# Patient Record
Sex: FEMALE | Race: WHITE | NOT HISPANIC OR LATINO | Employment: PART TIME | URBAN - METROPOLITAN AREA
[De-identification: names, ages, dates, MRNs, and addresses within clinical notes are randomized per-mention and may not be internally consistent; named-entity substitution may affect disease eponyms.]

---

## 2017-02-06 ENCOUNTER — HOSPITAL ENCOUNTER (OUTPATIENT)
Dept: RADIOLOGY | Facility: OTHER | Age: 52
Discharge: HOME | End: 2017-02-06
Attending: OTOLARYNGOLOGY
Payer: MEDICAID

## 2017-02-06 DIAGNOSIS — R07.0 THROAT PAIN: ICD-10-CM

## 2017-02-06 PROCEDURE — 70491 CT SOFT TISSUE NECK W/DYE: CPT | Mod: TC

## 2017-02-06 PROCEDURE — 70491 CT SOFT TISSUE NECK W/DYE: CPT | Mod: 26,,, | Performed by: RADIOLOGY

## 2017-02-06 PROCEDURE — 25500020 PHARM REV CODE 255: Performed by: OTOLARYNGOLOGY

## 2017-02-06 RX ADMIN — IOHEXOL 75 ML: 350 INJECTION, SOLUTION INTRAVENOUS at 10:02

## 2018-04-05 DIAGNOSIS — J44.9 COPD (CHRONIC OBSTRUCTIVE PULMONARY DISEASE): Primary | ICD-10-CM

## 2018-06-20 ENCOUNTER — CLINICAL SUPPORT (OUTPATIENT)
Dept: OCCUPATIONAL MEDICINE | Facility: CLINIC | Age: 53
End: 2018-06-20

## 2018-06-20 DIAGNOSIS — Z02.83 ENCOUNTER FOR DRUG SCREENING: Primary | ICD-10-CM

## 2018-06-20 LAB
CTP QC/QA: YES
POC 5 PANEL DRUG SCREEN: NEGATIVE

## 2018-06-20 PROCEDURE — 80305 DRUG TEST PRSMV DIR OPT OBS: CPT | Mod: QW,S$GLB,, | Performed by: NURSE PRACTITIONER

## 2019-03-10 ENCOUNTER — HOSPITAL ENCOUNTER (EMERGENCY)
Facility: HOSPITAL | Age: 54
Discharge: HOME | End: 2019-03-10
Attending: EMERGENCY MEDICINE
Payer: MEDICAID

## 2019-03-10 VITALS
SYSTOLIC BLOOD PRESSURE: 139 MMHG | HEART RATE: 80 BPM | DIASTOLIC BLOOD PRESSURE: 75 MMHG | OXYGEN SATURATION: 97 % | WEIGHT: 145 LBS | RESPIRATION RATE: 16 BRPM | HEIGHT: 64 IN | TEMPERATURE: 99 F | BODY MASS INDEX: 24.75 KG/M2

## 2019-03-10 DIAGNOSIS — R05.9 COUGH: ICD-10-CM

## 2019-03-10 DIAGNOSIS — J20.9 ACUTE BRONCHITIS, UNSPECIFIED ORGANISM: Primary | ICD-10-CM

## 2019-03-10 LAB
CTP QC/QA: YES
POC MOLECULAR INFLUENZA A AGN: NEGATIVE
POC MOLECULAR INFLUENZA B AGN: NEGATIVE

## 2019-03-10 PROCEDURE — 94640 AIRWAY INHALATION TREATMENT: CPT

## 2019-03-10 PROCEDURE — 99284 EMERGENCY DEPT VISIT MOD MDM: CPT | Mod: ,,, | Performed by: EMERGENCY MEDICINE

## 2019-03-10 PROCEDURE — 99284 PR EMERGENCY DEPT VISIT,LEVEL IV: ICD-10-PCS | Mod: ,,, | Performed by: EMERGENCY MEDICINE

## 2019-03-10 PROCEDURE — 63600175 PHARM REV CODE 636 W HCPCS: Performed by: EMERGENCY MEDICINE

## 2019-03-10 PROCEDURE — 25000242 PHARM REV CODE 250 ALT 637 W/ HCPCS: Performed by: EMERGENCY MEDICINE

## 2019-03-10 PROCEDURE — 99284 EMERGENCY DEPT VISIT MOD MDM: CPT | Mod: 25

## 2019-03-10 PROCEDURE — 87502 INFLUENZA DNA AMP PROBE: CPT

## 2019-03-10 PROCEDURE — 96372 THER/PROPH/DIAG INJ SC/IM: CPT

## 2019-03-10 RX ORDER — GABAPENTIN 300 MG/1
300 CAPSULE ORAL 2 TIMES DAILY
COMMUNITY

## 2019-03-10 RX ORDER — IPRATROPIUM BROMIDE AND ALBUTEROL SULFATE 2.5; .5 MG/3ML; MG/3ML
3 SOLUTION RESPIRATORY (INHALATION)
Status: COMPLETED | OUTPATIENT
Start: 2019-03-10 | End: 2019-03-10

## 2019-03-10 RX ORDER — DEXTROAMPHETAMINE SACCHARATE, AMPHETAMINE ASPARTATE, DEXTROAMPHETAMINE SULFATE AND AMPHETAMINE SULFATE 3.75; 3.75; 3.75; 3.75 MG/1; MG/1; MG/1; MG/1
15 TABLET ORAL 2 TIMES DAILY
COMMUNITY

## 2019-03-10 RX ORDER — PREDNISONE 20 MG/1
40 TABLET ORAL DAILY
Qty: 8 TABLET | Refills: 0 | Status: SHIPPED | OUTPATIENT
Start: 2019-03-10 | End: 2019-03-14

## 2019-03-10 RX ORDER — FLUTICASONE PROPIONATE 220 UG/1
1 AEROSOL, METERED RESPIRATORY (INHALATION) 2 TIMES DAILY
Qty: 12 G | Refills: 1 | Status: SHIPPED | OUTPATIENT
Start: 2019-03-10 | End: 2019-04-09

## 2019-03-10 RX ORDER — PREDNISONE 20 MG/1
40 TABLET ORAL
Status: COMPLETED | OUTPATIENT
Start: 2019-03-10 | End: 2019-03-10

## 2019-03-10 RX ORDER — KETOROLAC TROMETHAMINE 30 MG/ML
15 INJECTION, SOLUTION INTRAMUSCULAR; INTRAVENOUS
Status: COMPLETED | OUTPATIENT
Start: 2019-03-10 | End: 2019-03-10

## 2019-03-10 RX ADMIN — PREDNISONE 40 MG: 20 TABLET ORAL at 06:03

## 2019-03-10 RX ADMIN — KETOROLAC TROMETHAMINE 15 MG: 30 INJECTION, SOLUTION INTRAMUSCULAR at 06:03

## 2019-03-10 RX ADMIN — IPRATROPIUM BROMIDE AND ALBUTEROL SULFATE 3 ML: .5; 3 SOLUTION RESPIRATORY (INHALATION) at 06:03

## 2019-03-10 NOTE — ED NOTES
LOC: The patient is awake and alert; oriented x 3 and speaking appropriately.  APPEARANCE: Patient resting comfortably, patient is clean and well groomed  SKIN: warm and dry, normal skin turgor & moist mucus membranes, skin intact, no breakdown noted.  MUSCULOSKELETAL: Patient moving all extremities well, no obvious swelling or deformities noted  RESPIRATORY: Airway is open and patent, breath sounds w/ crackles and wheezing throughout all lung fields; respirations are spontaneous, normal effort and rate, frequent coughing- moist loose cough.  CARDIAC: Patient has a normal rate, no peripheral edema noted, capillary refill < 3 seconds; No complaints of chest pain   ABDOMEN: Soft and non tender to palpation, no distention noted.

## 2019-03-10 NOTE — ED TRIAGE NOTES
Hx copd and has pneumonia x 2 in last 5 yrs.   Feeling bad since yesterday w/ SOB, chest congestion and feeling weak. Exposed to another employee w/ a stomach virus. Denies n/v, having sinus pressure and h/a .Had fever two wks ago but not recently .Having a moist frequent cough.

## 2019-03-10 NOTE — DISCHARGE INSTRUCTIONS
[place] nebulizer or combivent inhalational treatment every 6 hours while awake for next 5 days, then as needed for wheezing.

## 2019-03-10 NOTE — ED PROVIDER NOTES
Encounter Date: 3/10/2019    SCRIBE #1 NOTE: I, Jacquelinheidi Julian, am scribing for, and in the presence of, Dr. Garza.       History     Chief Complaint   Patient presents with    Nasal Congestion     53 y.o.  female with a history of asthma, COPD, bronchitis and PNA twice in the past year presents to the ED complaining of a 1 day history of weakness, SOB, congestion, and fatigue. Associated symptoms include rhinorrhea and headache. She began feeling poor yesterday and had to leave work early. 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 She denies any nausea, vomiting, or fever. Patient reports having a cold 3 weeks ago for which she has been taking antibiotics for. She regularly sees her PCP. Patient endorses using Combivent nebulizer daily. Patient has a history of smoking daily.       The history is provided by the patient and medical records.     Review of patient's allergies indicates:   Allergen Reactions    Penicillins Nausea Only     Past Medical History:   Diagnosis Date    Asthma     Hepatitis C     received treatment    Seizures     unsure etiology possibly drug induced 3 times     Past Surgical History:   Procedure Laterality Date    [id number] CORD Bilateral 11/29/2016    Performed by Gertrudis Conley MD at [facility] OR    LIVER BIOPSY      MICROLARYNGOSCOPY N/A 11/29/2016    Performed by Gertrudis Conley MD at [facility] OR     No family history on file.  Social History     Tobacco Use    Smoking status: Current Every Day Smoker     Packs/day: 0.50   Substance Use Topics    Alcohol use: No    Drug use: Not on file     Review of Systems   Constitutional: Positive for fatigue. Negative for fever.   HENT: Positive for congestion and rhinorrhea. Negative for sore throat.    Respiratory: Positive for shortness of breath.    Cardiovascular: Negative for chest pain.   Gastrointestinal: Negative for  abdominal pain.   Genitourinary: Negative for flank pain.   Musculoskeletal: Negative for neck pain.   Skin: Negative for rash.   Neurological: Positive for headaches. Negative for weakness.   Psychiatric/Behavioral: Negative for confusion.   All other systems reviewed and are negative.      Physical Exam     Initial Vitals [03/10/19 1650]   BP Pulse Resp Temp SpO2   138/63 80 16 97.9 °F (36.6 °C) 96 %      MAP       --         Physical Exam    Vitals reviewed.  Constitutional:   53-year-old  woman, no acute distress noted   HENT:   Head: Normocephalic and atraumatic.   Mouth/Throat: Oropharynx is clear and moist.   Eyes: EOM are normal. Pupils are equal, round, and reactive to light.   Neck: No tracheal deviation present.   Cardiovascular: Normal rate, regular rhythm and intact distal pulses.   Pulmonary/Chest: No stridor.   Coarse expiratory wheezes noted throughout without tachypnea or accessory muscle use   Abdominal: Soft. She exhibits no distension. There is no tenderness. There is no guarding.   Musculoskeletal: Normal range of motion. She exhibits no edema.   Neurological: She is alert and oriented to person, place, and time. GCS score is 15. GCS eye subscore is 4. GCS verbal subscore is 5. GCS motor subscore is 6.   Skin: Skin is warm and dry.   Psychiatric: She has a normal mood and affect. Thought content normal.         ED Course   Procedures  Labs Reviewed - No data to display       Imaging Results    None       X-Rays:   Independently Interpreted Readings:   Chest X-Ray: Scant atelectasis noted at the left lower lung zone without maría evidence of consolidation     Medical Decision Making:   History:   Old Medical Records: I decided to obtain old medical records.  Differential Diagnosis:   Asthma exacerbation, subacute bronchitis, lobar pneumonia, influenza  Clinical Tests:   Radiological Study: Ordered and Reviewed              Attending Attestation:             Attending ED Notes:   Patient  describes improvement of presenting symptoms with emergency department therapy.  Influenza screening is within normal limits.  Chest x-ray does not exhibit any maría evidence of consolidation, but does identify a small area of atelectasis at the left lung base has described by Radiology.  I do not feel that antibiotics are indicated at this time, as this patient does not exhibit any fever or maría evidence of consolidative pneumonia.  She will be discharged home in improved condition with prescriptions for prednisone 40 mg to be taken daily for the next 4 days, as well as scheduled utilization of her Combivent MDI or albuterol nebulizer every 6 hr while awake.  Additionally, a Flovent inhaler has been prescribed with recommendations for daily b.i.d. use, and I have recommended close primary care follow-up with her managing physician within 5-7 days and return to the ED as needed for worsening of current condition, onset of fever, or other urgent concerns.             Clinical Impression:       ICD-10-CM ICD-9-CM   1. Acute bronchitis, unspecified organism J20.9 466.0   2. Cough R05 786.2         Disposition:   Disposition: Discharged  Condition: Stable                        Srini Garza MD  03/10/19 1943

## 2021-01-28 ENCOUNTER — TELEPHONE (OUTPATIENT)
Dept: SURGERY | Facility: CLINIC | Age: 56
End: 2021-01-28

## 2021-02-23 ENCOUNTER — TELEPHONE (OUTPATIENT)
Dept: SURGERY | Facility: CLINIC | Age: 56
End: 2021-02-23

## 2021-02-23 DIAGNOSIS — Z12.11 SCREEN FOR COLON CANCER: Primary | ICD-10-CM
